# Patient Record
Sex: FEMALE | Race: WHITE | NOT HISPANIC OR LATINO | Employment: OTHER | URBAN - METROPOLITAN AREA
[De-identification: names, ages, dates, MRNs, and addresses within clinical notes are randomized per-mention and may not be internally consistent; named-entity substitution may affect disease eponyms.]

---

## 2023-03-06 LAB
APPEARANCE, URINE: NORMAL
ASCORBIC ACID: NORMAL MG/DL
BILIRUBIN, URINE: NORMAL
BLOOD, URINE: NORMAL
COLOR, URINE: NORMAL
GLUCOSE, URINE: NORMAL
KETONES, URINE: NORMAL
LEUKOCYTE ESTERASE, URINE: NORMAL
NITRITE, URINE: NORMAL
PH, URINE: NORMAL
PROTEIN, URINE: NORMAL
SPECIFIC GRAVITY, URINE: NORMAL
URINE CULTURE: NORMAL
UROBILINOGEN, URINE: NORMAL

## 2023-03-07 LAB
CREATININE (MG/DL) IN SER/PLAS: 0.88 MG/DL (ref 0.5–1.05)
GFR FEMALE: 76 ML/MIN/1.73M2

## 2023-08-12 LAB
ALANINE AMINOTRANSFERASE (SGPT) (U/L) IN SER/PLAS: 12 U/L (ref 7–45)
ALBUMIN (G/DL) IN SER/PLAS: 3.7 G/DL (ref 3.4–5)
ALKALINE PHOSPHATASE (U/L) IN SER/PLAS: 77 U/L (ref 33–110)
ANION GAP IN SER/PLAS: 13 MMOL/L (ref 10–20)
ASPARTATE AMINOTRANSFERASE (SGOT) (U/L) IN SER/PLAS: 20 U/L (ref 9–39)
BILIRUBIN TOTAL (MG/DL) IN SER/PLAS: 0.4 MG/DL (ref 0–1.2)
CALCIUM (MG/DL) IN SER/PLAS: 8.9 MG/DL (ref 8.6–10.3)
CARBON DIOXIDE, TOTAL (MMOL/L) IN SER/PLAS: 31 MMOL/L (ref 21–32)
CHLORIDE (MMOL/L) IN SER/PLAS: 102 MMOL/L (ref 98–107)
CHOLESTEROL (MG/DL) IN SER/PLAS: 181 MG/DL (ref 0–199)
CHOLESTEROL IN HDL (MG/DL) IN SER/PLAS: 64.6 MG/DL
CHOLESTEROL/HDL RATIO: 2.8
CREATININE (MG/DL) IN SER/PLAS: 0.93 MG/DL (ref 0.5–1.05)
ERYTHROCYTE DISTRIBUTION WIDTH (RATIO) BY AUTOMATED COUNT: 12.6 % (ref 11.5–14.5)
ERYTHROCYTE MEAN CORPUSCULAR HEMOGLOBIN CONCENTRATION (G/DL) BY AUTOMATED: 32 G/DL (ref 32–36)
ERYTHROCYTE MEAN CORPUSCULAR VOLUME (FL) BY AUTOMATED COUNT: 97 FL (ref 80–100)
ERYTHROCYTES (10*6/UL) IN BLOOD BY AUTOMATED COUNT: 3.82 X10E12/L (ref 4–5.2)
ESTIMATED AVERAGE GLUCOSE FOR HBA1C: 131 MG/DL
GFR FEMALE: 71 ML/MIN/1.73M2
GLUCOSE (MG/DL) IN SER/PLAS: 108 MG/DL (ref 74–99)
HEMATOCRIT (%) IN BLOOD BY AUTOMATED COUNT: 37.2 % (ref 36–46)
HEMOGLOBIN (G/DL) IN BLOOD: 11.9 G/DL (ref 12–16)
HEMOGLOBIN A1C/HEMOGLOBIN TOTAL IN BLOOD: 6.2 %
LDL: 96 MG/DL (ref 0–99)
LEUKOCYTES (10*3/UL) IN BLOOD BY AUTOMATED COUNT: 5.3 X10E9/L (ref 4.4–11.3)
PLATELETS (10*3/UL) IN BLOOD AUTOMATED COUNT: 220 X10E9/L (ref 150–450)
POTASSIUM (MMOL/L) IN SER/PLAS: 4.3 MMOL/L (ref 3.5–5.3)
PROTEIN TOTAL: 6.6 G/DL (ref 6.4–8.2)
SODIUM (MMOL/L) IN SER/PLAS: 142 MMOL/L (ref 136–145)
THYROTROPIN (MIU/L) IN SER/PLAS BY DETECTION LIMIT <= 0.05 MIU/L: 0.9 MIU/L (ref 0.44–3.98)
TRIGLYCERIDE (MG/DL) IN SER/PLAS: 102 MG/DL (ref 0–149)
UREA NITROGEN (MG/DL) IN SER/PLAS: 27 MG/DL (ref 6–23)
VLDL: 20 MG/DL (ref 0–40)

## 2023-11-04 DIAGNOSIS — R31.9 HEMATURIA: Primary | ICD-10-CM

## 2023-11-06 ENCOUNTER — LAB (OUTPATIENT)
Dept: LAB | Facility: LAB | Age: 58
End: 2023-11-06
Payer: MEDICARE

## 2023-11-06 DIAGNOSIS — R31.9 HEMATURIA: ICD-10-CM

## 2023-11-06 LAB
APPEARANCE UR: ABNORMAL
BACTERIA #/AREA URNS AUTO: ABNORMAL /HPF
BILIRUB UR STRIP.AUTO-MCNC: NEGATIVE MG/DL
COLOR UR: YELLOW
GLUCOSE UR STRIP.AUTO-MCNC: NEGATIVE MG/DL
KETONES UR STRIP.AUTO-MCNC: NEGATIVE MG/DL
LEUKOCYTE ESTERASE UR QL STRIP.AUTO: ABNORMAL
MUCOUS THREADS #/AREA URNS AUTO: ABNORMAL /LPF
NITRITE UR QL STRIP.AUTO: POSITIVE
PH UR STRIP.AUTO: 6 [PH]
PROT UR STRIP.AUTO-MCNC: ABNORMAL MG/DL
RBC # UR STRIP.AUTO: ABNORMAL /UL
RBC #/AREA URNS AUTO: >20 /HPF
SP GR UR STRIP.AUTO: 1.01
SQUAMOUS #/AREA URNS AUTO: ABNORMAL /HPF
UROBILINOGEN UR STRIP.AUTO-MCNC: <2 MG/DL
WBC #/AREA URNS AUTO: >50 /HPF
WBC CLUMPS #/AREA URNS AUTO: ABNORMAL /HPF

## 2023-11-06 PROCEDURE — 81001 URINALYSIS AUTO W/SCOPE: CPT

## 2024-08-29 NOTE — PROGRESS NOTES
[facility] [place]  Outpatient Clinic Note    No chief complaint on file.      Subjective   Mercedes Sosa is a 59 y.o. female presenting for follow up UTI. Patient is accompanied by her sister Celia and aid from facility who help to provide history.     History of Present Illness:  Patient is nonverbal, trisomy 21 diagnosis. Resident at [facility] in [facility]. Last visit 3/23/2023 with Alessandra Chatman CNP.   Patient has a history of neurogenic bladder s/p SPT placement 11/14/18 by Dr Griffin which was ultimately removed due to UTIs and impact on QOL.   She has been prescribed methenamine and tamsulosin.      Today patient is accompanied by aid at facility and her sister. Goal of today's visit is to re-establish care and follow up. No concerns or complaints. No recent UTIs. Typically aware/suspicious for UTI due to behavioral changes. Have not appreciated recently. Patient is voiding spontaneously. Doesn't void on toilet unless reminded to do so. Voids into depend otherwise. No concern for worsened frequency or discomfort with voiding. No hematuria or vaginal bleeding. Denies fevers, chills, nausea or vomiting. Tolerating PO intake well.     Has been seen by nephrology, told follow up no longer necessary per patient's sister. She is currently taking Tamsulosin 0.4mg bid, methenamine 1g BID. Cranberry 425mg daily.     Past Medical History and Surgical History   Past Medical History:   Diagnosis Date    Encounter for follow-up examination after completed treatment for conditions other than malignant neoplasm 10/07/2019    Hospital discharge follow-up    Other conditions influencing health status     Nephrolithiasis Of Both Kidneys    Personal history of other endocrine, nutritional and metabolic disease     History of diabetes mellitus    Personal history of other endocrine, nutritional and metabolic disease     History of hypothyroidism    Personal history of urinary (tract) infections 11/28/2019    History of  urinary tract infection    Personal history of urinary (tract) infections     History of acute cystitis    Urinary tract infection, site not specified 03/19/2020    Recurrent UTI     No past surgical history on file.    Medications  Current Outpatient Medications on File Prior to Visit   Medication Sig Dispense Refill    acetaminophen (Tylenol) 325 mg tablet Take by mouth every 4 hours if needed.      cranberry 400 mg capsule Take 1 capsule by mouth once daily.      ezetimibe (Zetia) 10 mg tablet Take 1 tablet (10 mg) by mouth once daily.      FreeStyle Lite Strips strip TEST BLOOD SUGAR ONCE DAILY      levothyroxine (Synthroid, Levoxyl) 75 mcg tablet Take 1 tablet (75 mcg) by mouth once daily.      loperamide (Imodium A-D) 2 mg tablet Take by mouth.      magnesium hydroxide (Milk of Magnesia) 400 mg/5 mL suspension Take by mouth once daily.      methenamine hippurate (Hiprex) 1 gram tablet Take 1 tablet (1 g) by mouth once daily at bedtime.      aspirin-calcium carbonate 81 mg-300 mg calcium(777 mg) tablet Take 1 tablet by mouth once daily.      calcium carbonate EX (Antacid Extra-Strength) 300 mg (750 mg) chewable tablet Chew 1 tablet (750 mg) twice a day.      chlorhexidine (Peridex) 0.12 % solution Take 15 mL by mouth twice a day.      folic acid (Folvite) 1 mg tablet Take 1 tablet (1 mg) by mouth once daily.       No current facility-administered medications on file prior to visit.     Allergies:  No Known Allergies    Objective     Physicial Exam  Vitals:    09/04/24 1122   Temp: 36.2 °C (97.1 °F)     Body mass index is 33.02 kg/m².    General: Well developed, well nourished, alert and cooperative, appears in no acute distress. Non-verbal  Eyes: Non-injected conjunctiva, sclera clear, no proptosis  Cardiac: Extremities are warm and well perfused. No edema, cyanosis or pallor.   Lungs: Breathing is easy, non-labored.  Normal diaphragmatic movement.  MSK: Ambulatory with steady gait, assisted by sister  Neuro:  no focal defecits  Psych: Pleasant in interaction, no abnormal behaviors   Skin: no obvious lesions, no rashes.    PVR by US: 305mL - not a true post void, about 1 hour after last toilet void.     12/15/23 creatinine 0.97, eGFR 67  03/06/2023 Urinalysis and Culture Negative      03/08/2023 CT Urogram demonstrates No renal, ureteral, or urinary bladder calculi. Asymmetric right renal atrophy and cortical scarring similar to previous. Left renal lower pole 2 cm cyst. No enhancing lesions bilaterally. No hydroureteronephrosis bilaterally. Nonspecific bladder wall thickening.     Review of Systems  All other systems have been reviewed and are negative for complaint.    Assessment and Plan:  Mercedes Sosa is a 59 y.o. female with history of UTI, neurogenic bladder. Previous history of SP tube which was removed due to UTI concerns and impact on QOL. Doing well, no recent UTIs. CT Urogram 3/2023 without hydro, masses or stones.     > BMP and renal US ordered today. Would like to confirm stability of renal function and assess for hydronephrosis. Also follow up renal cyst. If kidney function normal and renal US without hydro, will likely plan to continue with current treatment plan.   > Patient has been doing well with methenamine, no recent UTIs. Concern for recurrent UTI if discontinued. Will continue for now and assess renal function. Continue Flomax. Refills not indicated at this time per aid.  Rx in system for Methenamine 1g daily. Aid mentioned BID dosing today. Will confirm at follow up.     > No signs or concern for UTI currently. Will continue to monitor.     Follow up: Telehealth in 1 month to review labs, imaging, treatment plan moving forward    All questions and concerns were addressed. Patient verbalizes understanding and has no other questions at this time.     Nell Todd PA-C  09/04/24 12:25 PM  Clinic phone: [phone], [phone]

## 2024-09-04 ENCOUNTER — APPOINTMENT (OUTPATIENT)
Dept: UROLOGY | Facility: CLINIC | Age: 59
End: 2024-09-04
Payer: MEDICARE

## 2024-09-04 VITALS — BODY MASS INDEX: 25.04 KG/M2 | WEIGHT: 108.2 LBS | TEMPERATURE: 97.1 F | HEIGHT: 55 IN

## 2024-09-04 DIAGNOSIS — N31.9 NEUROGENIC BLADDER: Primary | ICD-10-CM

## 2024-09-04 DIAGNOSIS — Z87.440 HISTORY OF UTI: ICD-10-CM

## 2024-09-04 DIAGNOSIS — R33.9 INCOMPLETE BLADDER EMPTYING: ICD-10-CM

## 2024-09-04 PROCEDURE — 1036F TOBACCO NON-USER: CPT | Performed by: PHYSICIAN ASSISTANT

## 2024-09-04 PROCEDURE — 3008F BODY MASS INDEX DOCD: CPT | Performed by: PHYSICIAN ASSISTANT

## 2024-09-04 PROCEDURE — 99213 OFFICE O/P EST LOW 20 MIN: CPT | Performed by: PHYSICIAN ASSISTANT

## 2024-09-04 PROCEDURE — 51798 US URINE CAPACITY MEASURE: CPT | Performed by: PHYSICIAN ASSISTANT

## 2024-09-04 RX ORDER — BLOOD-GLUCOSE METER
KIT MISCELLANEOUS
COMMUNITY
Start: 2017-08-23

## 2024-09-04 RX ORDER — FOLIC ACID 1 MG/1
1 TABLET ORAL DAILY
COMMUNITY

## 2024-09-04 RX ORDER — METHENAMINE HIPPURATE 1000 MG/1
1 TABLET ORAL NIGHTLY
COMMUNITY
Start: 2019-05-08

## 2024-09-04 RX ORDER — LOPERAMIDE HCL 2 MG
TABLET ORAL
COMMUNITY
Start: 2020-02-12

## 2024-09-04 RX ORDER — ADHESIVE BANDAGE
BANDAGE TOPICAL
COMMUNITY
Start: 2019-08-21

## 2024-09-04 RX ORDER — LEVOTHYROXINE SODIUM 75 UG/1
1 TABLET ORAL DAILY
COMMUNITY
Start: 2012-10-16

## 2024-09-04 RX ORDER — CHLORHEXIDINE GLUCONATE ORAL RINSE 1.2 MG/ML
15 SOLUTION DENTAL 2 TIMES DAILY
COMMUNITY

## 2024-09-04 RX ORDER — EZETIMIBE 10 MG/1
1 TABLET ORAL DAILY
COMMUNITY
Start: 2019-02-07

## 2024-09-04 RX ORDER — ACETAMINOPHEN 325 MG/1
TABLET ORAL EVERY 4 HOURS PRN
COMMUNITY
Start: 2017-03-13

## 2024-09-04 ASSESSMENT — PAIN SCALES - GENERAL: PAINLEVEL: 0-NO PAIN

## 2024-10-04 ENCOUNTER — APPOINTMENT (OUTPATIENT)
Dept: UROLOGY | Facility: CLINIC | Age: 59
End: 2024-10-04
Payer: MEDICARE

## 2024-10-04 DIAGNOSIS — Z87.440 HISTORY OF UTI: Primary | ICD-10-CM

## 2024-10-04 DIAGNOSIS — N31.9 NEUROGENIC BLADDER: ICD-10-CM

## 2024-10-04 PROCEDURE — 99213 OFFICE O/P EST LOW 20 MIN: CPT | Performed by: NURSE PRACTITIONER

## 2024-10-04 PROCEDURE — 1036F TOBACCO NON-USER: CPT | Performed by: NURSE PRACTITIONER

## 2024-10-04 NOTE — PROGRESS NOTES
[facility] [place]  Outpatient Clinic Note    No chief complaint on file.      Subjective   Mercedes Sosa is a 59 y.o. female presenting virtually for follow up UTI.   Patient is accompanied by her sister Celia and aid from facility who help to provide history.     History of Present Illness:  Patient is nonverbal, trisomy 21 diagnosis. Resident at [facility] in [facility]. Last visit Blanka Todd PA-C 9/4/2024, Recommended labs and Renal Ultrasound. Not yet completed.     Patient has a history of neurogenic bladder s/p SPT placement 11/14/18 by Dr Griffin which was ultimately removed due to UTIs and impact on QOL.   She has been prescribed methenamine and tamsulosin.      No concerns or complaints. No recent UTIs. Typically aware/suspicious for UTI due to behavioral changes. Patient is voiding spontaneously. Doesn't void on toilet unless reminded to do so. Voids into depend otherwise. No concern for worsened frequency or discomfort with voiding. No hematuria or vaginal bleeding. Denies fevers, chills, nausea or vomiting. Tolerating PO intake well.     Has been seen by nephrology, told follow up no longer necessary per patient's sister. She is currently taking Tamsulosin 0.4mg bid, methenamine 1g BID. Cranberry 425mg daily.     Past Medical History and Surgical History   Past Medical History:   Diagnosis Date    Encounter for follow-up examination after completed treatment for conditions other than malignant neoplasm 10/07/2019    Hospital discharge follow-up    Other conditions influencing health status     Nephrolithiasis Of Both Kidneys    Personal history of other endocrine, nutritional and metabolic disease     History of diabetes mellitus    Personal history of other endocrine, nutritional and metabolic disease     History of hypothyroidism    Personal history of urinary (tract) infections 11/28/2019    History of urinary tract infection    Personal history of urinary (tract) infections     History  of acute cystitis    Urinary tract infection, site not specified 03/19/2020    Recurrent UTI     No past surgical history on file.    Medications  Current Outpatient Medications on File Prior to Visit   Medication Sig Dispense Refill    acetaminophen (Tylenol) 325 mg tablet Take by mouth every 4 hours if needed.      aspirin-calcium carbonate 81 mg-300 mg calcium(777 mg) tablet Take 1 tablet by mouth once daily.      calcium carbonate EX (Antacid Extra-Strength) 300 mg (750 mg) chewable tablet Chew 1 tablet (750 mg) twice a day.      chlorhexidine (Peridex) 0.12 % solution Take 15 mL by mouth twice a day.      cranberry 400 mg capsule Take 1 capsule by mouth once daily.      ezetimibe (Zetia) 10 mg tablet Take 1 tablet (10 mg) by mouth once daily.      folic acid (Folvite) 1 mg tablet Take 1 tablet (1 mg) by mouth once daily.      FreeStyle Lite Strips strip TEST BLOOD SUGAR ONCE DAILY      levothyroxine (Synthroid, Levoxyl) 75 mcg tablet Take 1 tablet (75 mcg) by mouth once daily.      loperamide (Imodium A-D) 2 mg tablet Take by mouth.      magnesium hydroxide (Milk of Magnesia) 400 mg/5 mL suspension Take by mouth once daily.      methenamine hippurate (Hiprex) 1 gram tablet Take 1 tablet (1 g) by mouth once daily at bedtime.       No current facility-administered medications on file prior to visit.     Allergies:  No Known Allergies    Objective     Physicial Exam  There were no vitals filed for this visit.    There is no height or weight on file to calculate BMI.    General: Well developed, well nourished, alert and cooperative, appears in no acute distress. Non-verbal  Eyes: Non-injected conjunctiva, sclera clear, no proptosis  Cardiac: Extremities are warm and well perfused. No edema, cyanosis or pallor.   Lungs: Breathing is easy, non-labored.  Normal diaphragmatic movement.  MSK: Ambulatory with steady gait, assisted by sister  Neuro: no focal defecits  Psych: Pleasant in interaction, no abnormal behaviors    Skin: no obvious lesions, no rashes.    PVR by US: 305mL - not a true post void, about 1 hour after last toilet void.     12/15/23 creatinine 0.97, eGFR 67  03/06/2023 Urinalysis and Culture Negative      03/08/2023 CT Urogram demonstrates No renal, ureteral, or urinary bladder calculi. Asymmetric right renal atrophy and cortical scarring similar to previous. Left renal lower pole 2 cm cyst. No enhancing lesions bilaterally. No hydroureteronephrosis bilaterally. Nonspecific bladder wall thickening.     Review of Systems  All other systems have been reviewed and are negative for complaint.    Assessment and Plan:  History of UTI, neurogenic bladder. Previous history of SP tube which was removed due to UTI concerns and impact on QOL. Doing well, no recent UTIs. CT Urogram 3/2023 without hydro, masses or stones.     Patient has been doing well with methenamine, no recent UTIs. Concern for recurrent UTI if discontinued. Will continue for now and assess renal function. Continue Flomax. Rx in system for Methenamine 1g daily. Aid mentioned BID dosing today.     No signs or concern for UTI currently. Will follow up in 3 to 4 weeks to review labs and imaging.   Will continue to monitor.    Some elements copied from Nell Todd PA-C note on 9/4/2024, the elements have been updated and all reflect current decision making from today 10/4/2024     All questions and concerns were addressed. Patient verbalizes understanding and has no other questions at this time.     Alessandra Chatman-- JENNIFER KNAPP  Office Phone:  [phone]

## 2024-11-02 NOTE — PROGRESS NOTES
[facility] [place]  Outpatient Clinic Note    No chief complaint on file.      Subjective   Mercedes Sosa is a 59 y.o. female presenting virtually for follow up UTI.   Patient is accompanied by her caretaker Yann and aid from facility who help to provide history.     History of Present Illness:  Patient is nonverbal, trisomy 21 diagnosis. Resident at [facility] in [facility]. Last visit Blanka Todd PA-C 9/4/2024, Recommended labs and Renal Ultrasound. Not yet completed.     Patient has a history of neurogenic bladder s/p SPT placement 11/14/18 by Dr Griffin which was ultimately removed due to UTIs and impact on QOL.   She has been prescribed methenamine and tamsulosin.      No concerns or complaints. Recent UTI Typically aware/suspicious for UTI due to behavioral changes. Patient is voiding spontaneously. Doesn't void on toilet unless reminded to do so. Voids into depend otherwise. No concern for worsened frequency or discomfort with voiding. No hematuria or vaginal bleeding. Denies fevers, chills, nausea or vomiting. Tolerating PO intake well.     Has been seen by nephrology, told follow up no longer necessary per patient's sister.     She is currently taking Tamsulosin 0.4mg bid, methenamine 1g BID. Cranberry 425mg daily.     Renal Ultrasound 10/8/2024 Community upload document reviewed   FINDINGS: The kidneys are normal in size and shape with an echogenic parenchymal echotexture. There is a hypoechoic lesion in the inferior pole the left kidney measuring 2.8 x 2.8 x 3.2 cm, thought to represent a cyst. No urinary calculus is seen. The right renal collecting system is dilated. The bladder is well distended. There is an echogenic focus in the middle of the bladder lumen which may represent artifact versus debris. The patient was unable to void on command. Right kidney 5.1 cm Left kidney 7.6 cm     IMPRESSION: Increased echogenicity of the renal parenchyma suggesting chronic medical renal disease.  Dilation of the right renal collecting system suggesting hydronephrosis. Consider CT for further evaluation. Cyst in the left kidney. Well distended bladder with linear echogenic focus in the bladder lumen    9/9/2024 Creatinine 1.2 and GFR 46. No change from 11/11/2022 labs.   Community uploaded document     Past Medical History and Surgical History   Past Medical History:   Diagnosis Date    Encounter for follow-up examination after completed treatment for conditions other than malignant neoplasm 10/07/2019    Hospital discharge follow-up    Other conditions influencing health status     Nephrolithiasis Of Both Kidneys    Personal history of other endocrine, nutritional and metabolic disease     History of diabetes mellitus    Personal history of other endocrine, nutritional and metabolic disease     History of hypothyroidism    Personal history of urinary (tract) infections 11/28/2019    History of urinary tract infection    Personal history of urinary (tract) infections     History of acute cystitis    Urinary tract infection, site not specified 03/19/2020    Recurrent UTI     No past surgical history on file.    Medications  Current Outpatient Medications on File Prior to Visit   Medication Sig Dispense Refill    acetaminophen (Tylenol) 325 mg tablet Take by mouth every 4 hours if needed.      aspirin-calcium carbonate 81 mg-300 mg calcium(777 mg) tablet Take 1 tablet by mouth once daily.      calcium carbonate EX (Antacid Extra-Strength) 300 mg (750 mg) chewable tablet Chew 1 tablet (750 mg) twice a day.      chlorhexidine (Peridex) 0.12 % solution Take 15 mL by mouth twice a day.      cranberry 400 mg capsule Take 1 capsule by mouth once daily.      ezetimibe (Zetia) 10 mg tablet Take 1 tablet (10 mg) by mouth once daily.      folic acid (Folvite) 1 mg tablet Take 1 tablet (1 mg) by mouth once daily.      FreeStyle Lite Strips strip TEST BLOOD SUGAR ONCE DAILY      levothyroxine (Synthroid, Levoxyl) 75 mcg  tablet Take 1 tablet (75 mcg) by mouth once daily.      loperamide (Imodium A-D) 2 mg tablet Take by mouth.      magnesium hydroxide (Milk of Magnesia) 400 mg/5 mL suspension Take by mouth once daily.      methenamine hippurate (Hiprex) 1 gram tablet Take 1 tablet (1 g) by mouth once daily at bedtime.       No current facility-administered medications on file prior to visit.     Allergies:  No Known Allergies    Objective     Physicial Exam  There were no vitals filed for this visit.    There is no height or weight on file to calculate BMI.    General: Well developed, well nourished, alert and cooperative, appears in no acute distress. Non-verbal  Eyes: Non-injected conjunctiva, sclera clear, no proptosis  Cardiac: Extremities are warm and well perfused. No edema, cyanosis or pallor.   Lungs: Breathing is easy, non-labored.  Normal diaphragmatic movement.  MSK: Ambulatory with steady gait, assisted by sister  Neuro: no focal defecits  Psych: Pleasant in interaction, no abnormal behaviors   Skin: no obvious lesions, no rashes.     03/08/2023 CT Urogram demonstrates No renal, ureteral, or urinary bladder calculi. Asymmetric right renal atrophy and cortical scarring similar to previous. Left renal lower pole 2 cm cyst. No enhancing lesions bilaterally. No hydroureteronephrosis bilaterally. Nonspecific bladder wall thickening.     Review of Systems  All other systems have been reviewed and are negative for complaint.    Assessment and Plan:  History of UTI, neurogenic bladder. Previous history of SP tube which was removed due to UTI concerns and impact on QOL. Doing well, no recent UTIs. CT Urogram 3/2023 without hydro, masses or stones.     Patient has been doing well with methenamine, no recent UTIs. Concern for recurrent UTI if discontinued. Will continue Flomax. And Methenamine 1g BID.   Will continue to monitor.    Some elements copied from Nell Todd PA-C note on 9/4/2024, the elements have been updated and  all reflect current decision making from today 10/4/2024     All questions and concerns were addressed. Patient verbalizes understanding and has no other questions at this time.     Alessandra Chatman-- JENNIFER NKAPP  Office Phone:  [phone]

## 2024-11-04 ENCOUNTER — APPOINTMENT (OUTPATIENT)
Dept: UROLOGY | Facility: CLINIC | Age: 59
End: 2024-11-04
Payer: MEDICARE

## 2024-11-04 DIAGNOSIS — N31.9 NEUROGENIC BLADDER: ICD-10-CM

## 2024-11-04 DIAGNOSIS — Z87.440 HISTORY OF UTI: Primary | ICD-10-CM

## 2024-11-04 PROCEDURE — 1036F TOBACCO NON-USER: CPT | Performed by: NURSE PRACTITIONER

## 2024-11-04 PROCEDURE — 99213 OFFICE O/P EST LOW 20 MIN: CPT | Performed by: NURSE PRACTITIONER

## 2024-11-04 RX ORDER — METHENAMINE HIPPURATE 1000 MG/1
1 TABLET ORAL 2 TIMES DAILY
Qty: 180 TABLET | Refills: 3 | Status: SHIPPED | OUTPATIENT
Start: 2024-11-04

## 2025-05-06 ENCOUNTER — APPOINTMENT (OUTPATIENT)
Dept: UROLOGY | Facility: CLINIC | Age: 60
End: 2025-05-06
Payer: COMMERCIAL

## 2025-05-19 ENCOUNTER — APPOINTMENT (OUTPATIENT)
Dept: UROLOGY | Facility: HOSPITAL | Age: 60
End: 2025-05-19
Payer: COMMERCIAL

## 2025-05-19 DIAGNOSIS — N31.9 NEUROGENIC BLADDER: Primary | ICD-10-CM

## 2025-05-19 DIAGNOSIS — Z87.440 HISTORY OF UTI: ICD-10-CM

## 2025-05-19 PROCEDURE — 1036F TOBACCO NON-USER: CPT | Performed by: NURSE PRACTITIONER

## 2025-05-19 PROCEDURE — 99213 OFFICE O/P EST LOW 20 MIN: CPT | Performed by: NURSE PRACTITIONER

## 2025-05-19 PROCEDURE — G2211 COMPLEX E/M VISIT ADD ON: HCPCS | Performed by: NURSE PRACTITIONER

## 2025-05-19 NOTE — PROGRESS NOTES
[facility] [place]  Outpatient Clinic Note    No chief complaint on file.      Sourav Sosa is a 59 y.o. female presenting virtually for follow up.  Patient is accompanied by the Director of Nursing who help to provide history.     History of Present Illness:  Patient is nonverbal, trisomy 21 diagnosis. Resident at [facility] in [facility]. Last visit Blanka Todd PA-C 9/4/2024. DON reports recent Klebsiella UTI treated with Cipro. Urine was cloudy and malodorous. No fevers or gross hematuria   Patient has a history of neurogenic bladder s/p SPT placement 11/14/18 by Dr Griffin which was ultimately removed due to UTIs and impact on QOL.   She continues taking methenamine and tamsulosin 0.8 mg     No new concerns or complaints.  Patient is voiding spontaneously. Doesn't void on toilet unless reminded to do so. Voids into depend otherwise. No concern for worsened frequency or discomfort with voiding. No hematuria or vaginal bleeding. Denies fevers, chills, nausea or vomiting. Tolerating PO intake well.     Has been seen by nephrology, told follow up no longer necessary per patient's sister.     She is currently taking Tamsulosin 0.4mg bid, methenamine 1g BID. Cranberry 425mg daily.     Renal Ultrasound 10/8/2024 Community upload document reviewed   FINDINGS: The kidneys are normal in size and shape with an echogenic parenchymal echotexture. There is a hypoechoic lesion in the inferior pole the left kidney measuring 2.8 x 2.8 x 3.2 cm, thought to represent a cyst. No urinary calculus is seen. The right renal collecting system is dilated. The bladder is well distended. There is an echogenic focus in the middle of the bladder lumen which may represent artifact versus debris. The patient was unable to void on command. Right kidney 5.1 cm Left kidney 7.6 cm     IMPRESSION: Increased echogenicity of the renal parenchyma suggesting chronic medical renal disease. Dilation of the right renal collecting  system suggesting hydronephrosis. Consider CT for further evaluation. Cyst in the left kidney. Well distended bladder with linear echogenic focus in the bladder lumen    9/9/2024 Creatinine 1.2 and GFR 46. No change from 11/11/2022 labs.   Community uploaded document     Past Medical History and Surgical History   Past Medical History:   Diagnosis Date    Encounter for follow-up examination after completed treatment for conditions other than malignant neoplasm 10/07/2019    Hospital discharge follow-up    Other conditions influencing health status     Nephrolithiasis Of Both Kidneys    Personal history of other endocrine, nutritional and metabolic disease     History of diabetes mellitus    Personal history of other endocrine, nutritional and metabolic disease     History of hypothyroidism    Personal history of urinary (tract) infections 11/28/2019    History of urinary tract infection    Personal history of urinary (tract) infections     History of acute cystitis    Urinary tract infection, site not specified 03/19/2020    Recurrent UTI     No past surgical history on file.    Medications  Current Outpatient Medications on File Prior to Visit   Medication Sig Dispense Refill    acetaminophen (Tylenol) 325 mg tablet Take by mouth every 4 hours if needed.      aspirin-calcium carbonate 81 mg-300 mg calcium(777 mg) tablet Take 1 tablet by mouth once daily.      calcium carbonate EX (Antacid Extra-Strength) 300 mg (750 mg) chewable tablet Chew 1 tablet (750 mg) twice a day.      chlorhexidine (Peridex) 0.12 % solution Take 15 mL by mouth twice a day.      cranberry 400 mg capsule Take 1 capsule by mouth once daily.      ezetimibe (Zetia) 10 mg tablet Take 1 tablet (10 mg) by mouth once daily.      folic acid (Folvite) 1 mg tablet Take 1 tablet (1 mg) by mouth once daily.      FreeStyle Lite Strips strip TEST BLOOD SUGAR ONCE DAILY      levothyroxine (Synthroid, Levoxyl) 75 mcg tablet Take 1 tablet (75 mcg) by mouth  once daily.      loperamide (Imodium A-D) 2 mg tablet Take by mouth.      magnesium hydroxide (Milk of Magnesia) 400 mg/5 mL suspension Take by mouth once daily.      methenamine hippurate (Hiprex) 1 gram tablet Take 1 tablet (1 g) by mouth 2 times a day. 180 tablet 3     No current facility-administered medications on file prior to visit.     Allergies:  No Known Allergies    Objective     Physicial Exam  There were no vitals filed for this visit.    There is no height or weight on file to calculate BMI.    General: Well developed, well nourished, alert and cooperative, appears in no acute distress. Non-verbal  Eyes: Non-injected conjunctiva, sclera clear, no proptosis  Cardiac: Extremities are warm and well perfused. No edema, cyanosis or pallor.   Lungs: Breathing is easy, non-labored.  Normal diaphragmatic movement.  MSK: Ambulatory with steady gait, assisted by sister  Neuro: no focal defecits  Psych: Pleasant in interaction, no abnormal behaviors   Skin: no obvious lesions, no rashes.     03/08/2023 CT Urogram demonstrates No renal, ureteral, or urinary bladder calculi. Asymmetric right renal atrophy and cortical scarring similar to previous. Left renal lower pole 2 cm cyst. No enhancing lesions bilaterally. No hydroureteronephrosis bilaterally. Nonspecific bladder wall thickening.     Review of Systems  All other systems have been reviewed and are negative for complaint.    Assessment and Plan:  History of UTI, neurogenic bladder. Previous history of SP tube which was removed due to UTI concerns and impact on QOL. Doing well.  Patient has been doing well with methenamine, no recent UTIs. Concern for recurrent UTI if discontinued. Will continue Flomax. And Methenamine 1g BID.   Will continue to monitor.    Some elements copied from Nell Todd PA-C note on 9/4/2024, the elements have been updated and all reflect current decision making from today 10/4/2024     All questions and concerns were addressed.  Patient verbalizes understanding and has no other questions at this time.     Alessandra Chatman-- JENNIFER KNAPP  Office Phone:  [phone]

## 2025-11-19 ENCOUNTER — APPOINTMENT (OUTPATIENT)
Dept: UROLOGY | Facility: CLINIC | Age: 60
End: 2025-11-19
Payer: COMMERCIAL